# Patient Record
Sex: FEMALE | Race: WHITE | Employment: FULL TIME | URBAN - METROPOLITAN AREA
[De-identification: names, ages, dates, MRNs, and addresses within clinical notes are randomized per-mention and may not be internally consistent; named-entity substitution may affect disease eponyms.]

---

## 2017-05-25 ENCOUNTER — OFFICE VISIT (OUTPATIENT)
Dept: OBGYN CLINIC | Age: 57
End: 2017-05-25

## 2017-05-25 VITALS
WEIGHT: 215 LBS | SYSTOLIC BLOOD PRESSURE: 132 MMHG | BODY MASS INDEX: 40.59 KG/M2 | HEIGHT: 61 IN | DIASTOLIC BLOOD PRESSURE: 84 MMHG

## 2017-05-25 DIAGNOSIS — Z01.419 WELL FEMALE EXAM WITH ROUTINE GYNECOLOGICAL EXAM: Primary | ICD-10-CM

## 2017-05-25 NOTE — PATIENT INSTRUCTIONS
Pap Test: Care Instructions  Your Care Instructions  The Pap test (also called a Pap smear) is a screening test for cancer of the cervix, which is the lower part of the uterus that opens into the vagina. The test can help your doctor find early changes in the cells that could lead to cancer. The sample of cells taken during your test has been sent to a lab so that an expert can look at the cells. It usually takes a week or two to get the results back. Follow-up care is a key part of your treatment and safety. Be sure to make and go to all appointments, and call your doctor if you are having problems. It's also a good idea to know your test results and keep a list of the medicines you take. What do the results mean? · A normal result means that the test did not find any abnormal cells in the sample. · An abnormal result can mean many things. Most of these are not cancer. The results of your test may be abnormal because:  ¨ You have an infection of the vagina or cervix, such as a yeast infection. ¨ You have an IUD (intrauterine device for birth control). ¨ You have low estrogen levels after menopause that are causing the cells to change. ¨ You have cell changes that may be a sign of precancer or cancer. The results are ranked based on how serious the changes might be. There are many other reasons why you might not get a normal result. If the results were abnormal, you may need to get another test within a few weeks or months. If the results show changes that could be a sign of cancer, you may need a test called a colposcopy, which provides a more complete view of the cervix. Sometimes the lab cannot use the sample because it does not contain enough cells or was not preserved well. If so, you may need to have the test again. This is not common, but it does happen from time to time. When should you call for help?   Watch closely for changes in your health, and be sure to contact your doctor if:  · You have vaginal bleeding or pain for more than 2 days after the test. It is normal to have a small amount of bleeding for a day or two after the test.  Where can you learn more? Go to http://shilpa-santy.info/. Enter V570 in the search box to learn more about \"Pap Test: Care Instructions. \"  Current as of: July 26, 2016  Content Version: 11.2  © 5369-7882 [employer]. Care instructions adapted under license by [employer] (which disclaims liability or warranty for this information). If you have questions about a medical condition or this instruction, always ask your healthcare professional. [address] any warranty or liability for your use of this information.

## 2017-05-25 NOTE — MR AVS SNAPSHOT
Visit Information Date & Time Provider Department Dept. Phone Encounter #  
 5/25/2017  8:30 AM Silvano Hernandez MD [facility] [phone] [id number] Upcoming Health Maintenance Date Due Hepatitis C Screening 1960 BREAST CANCER SCRN MAMMOGRAM 12/26/2009 FOBT Q 1 YEAR AGE 50-75 12/26/2009 PAP AKA CERVICAL CYTOLOGY 2/28/2017 INFLUENZA AGE 9 TO ADULT 8/1/2017 Allergies as of 5/25/2017  Review Complete On: 5/25/2017 By: Roberto Hart No Known Allergies Current Immunizations  Never Reviewed No immunizations on file. Not reviewed this visit You Were Diagnosed With   
  
 Codes Comments Well female exam with routine gynecological exam    -  Primary ICD-10-CM: K71.267 ICD-9-CM: V72.31 Vitals BP Height(growth percentile) Weight(growth percentile) BMI OB Status Smoking Status 132/84 5' 1\" (1.549 m) 215 lb (97.5 kg) 40.62 kg/m2 Postmenopausal Never Smoker BMI and BSA Data Body Mass Index Body Surface Area  
 40.62 kg/m 2 2.05 m 2 Preferred Pharmacy Pharmacy Name Phone [id number] [place], [address] [phone] Your Updated Medication List  
  
   
This list is accurate as of: 5/25/17  8:52 AM.  Always use your most recent med list.  
  
  
  
  
 estradiol 0.5 mg tablet Commonly known as:  ESTRACE Take 1/2 tab by mouth daily. loratadine 10 mg Cap Take  by mouth.  
  
 medroxyPROGESTERone 2.5 mg tablet Commonly known as:  PROVERA Take 1 Tab by mouth daily. SEROquel 25 mg tablet Generic drug:  QUEtiapine Take  by mouth. We Performed the Following PAP IG, HPV AND RFX HPV 37/35,15(225009) [[id number] Custom] Patient Instructions Pap Test: Care Instructions Your Care Instructions The Pap test (also called a Pap smear) is a screening test for cancer of the cervix, which is the lower part of the uterus that opens into the vagina. The test can help your doctor find early changes in the cells that could lead to cancer. The sample of cells taken during your test has been sent to a lab so that an expert can look at the cells. It usually takes a week or two to get the results back. Follow-up care is a key part of your treatment and safety. Be sure to make and go to all appointments, and call your doctor if you are having problems. It's also a good idea to know your test results and keep a list of the medicines you take. What do the results mean? · A normal result means that the test did not find any abnormal cells in the sample. · An abnormal result can mean many things. Most of these are not cancer. The results of your test may be abnormal because: 
¨ You have an infection of the vagina or cervix, such as a yeast infection. ¨ You have an IUD (intrauterine device for birth control). ¨ You have low estrogen levels after menopause that are causing the cells to change. ¨ You have cell changes that may be a sign of precancer or cancer. The results are ranked based on how serious the changes might be. There are many other reasons why you might not get a normal result. If the results were abnormal, you may need to get another test within a few weeks or months. If the results show changes that could be a sign of cancer, you may need a test called a colposcopy, which provides a more complete view of the cervix. Sometimes the lab cannot use the sample because it does not contain enough cells or was not preserved well. If so, you may need to have the test again. This is not common, but it does happen from time to time. When should you call for help?  
Watch closely for changes in your health, and be sure to contact your doctor if: 
· You have vaginal bleeding or pain for more than 2 days after the test. It is normal to have a small amount of bleeding for a day or two after the test. 
Where can you learn more? Go to http://shilpa-santy.info/. Enter E211 in the search box to learn more about \"Pap Test: Care Instructions. \" Current as of: July 26, 2016 Content Version: 11.2 © 7509-7772 [employer]. Care instructions adapted under license by [employer] (which disclaims liability or warranty for this information). If you have questions about a medical condition or this instruction, always ask your healthcare professional. [address] any warranty or liability for your use of this information. Introducing [facility]! Steph Del Cid introduces CallResto patient portal. Now you can access parts of your medical record, email your doctor's office, and request medication refills online. 1. In your internet browser, go to [email or web address] 2. Click on the First Time User? Click Here link in the Sign In box. You will see the New Member Sign Up page. 3. Enter your CallResto Access Code exactly as it appears below. You will not need to use this code after youve completed the sign-up process. If you do not sign up before the expiration date, you must request a new code. · CallResto Access Code: [email or web address] Expires: 8/23/2017  8:51 AM 
 
4. Enter the last four digits of your Social Security Number (xxxx) and Date of Birth (mm/dd/yyyy) as indicated and click Submit. You will be taken to the next sign-up page. 5. Create a Crunchyrollt ID. This will be your CallResto login ID and cannot be changed, so think of one that is secure and easy to remember. 6. Create a Crunchyrollt password. You can change your password at any time. 7. Enter your Password Reset Question and Answer. This can be used at a later time if you forget your password. 8. Enter your e-mail address.  You will receive e-mail notification when new information is available in [facility]. 9. Click Sign Up. You can now view and download portions of your medical record. 10. Click the Download Summary menu link to download a portable copy of your medical information. If you have questions, please visit the Frequently Asked Questions section of the [employer] website. Remember, [facility] [employer] [facility] is NOT to be used for urgent needs. For medical emergencies, dial 911. Now available from your iPhone and Android! Please provide this summary of care documentation to your next provider. Your primary care clinician is listed as Margaret Ramsey. If you have any questions after today's visit, please call [phone].

## 2017-05-25 NOTE — PROGRESS NOTES
Annual exam ages 40-58    Marilia Garcia is a No obstetric history on file. ,  61 y.o. female [facility] in [place] No LMP recorded. Patient is postmenopausal..    She presents for her annual checkup. She is having no significant problems. She has some vaginal dryness. With regard to the Gardasil vaccine, she is older than the age for which it is FDA approved. Menstrual status:  She is post menopausal.    She denies dysmenorrhea. She reports no premenstrual symptoms. Contraception:    The current method of family planning is none. Sexual history:    She  reports that she does not currently engage in sexual activity. Medical conditions:    Since her last annual GYN exam about one year ago, she has not the following changes in her health history: none. Pap and Mammogram History:    Her most recent Pap smear was normal, obtained 3 year(s) ago. The patient had a recent mammogram last summer per pt which was negative for malignancy. Breast Cancer History/Substance Abuse: negative    Osteoporosis History:    Family history does not include a first or second degree relative with osteopenia or osteoporosis. Past Medical History:   Diagnosis Date    Fibroids     H/O screening mammography 2013    normal per pt- done with PCP    Pap smear for cervical cancer screening 2/28/14 neg HPV NEG     Past Surgical History:   Procedure Laterality Date    HX TUBAL LIGATION         Current Outpatient Prescriptions   Medication Sig Dispense Refill    QUEtiapine (SEROQUEL) 25 mg tablet Take  by mouth.  loratadine 10 mg cap Take  by mouth.  medroxyPROGESTERone (PROVERA) 2.5 mg tablet Take 1 Tab by mouth daily. 30 Tab 11    estradiol (ESTRACE) 0.5 mg tablet Take 1/2 tab by mouth daily. 30 Tab 12     Allergies: Review of patient's allergies indicates no known allergies. Tobacco History:  reports that she has never smoked. She does not have any smokeless tobacco history on file.   Alcohol Abuse: reports that she does not drink alcohol. Drug Abuse:  reports that she does not use illicit drugs.     Family Medical/Cancer History:   Family History   Problem Relation Age of Onset    No Known Problems Mother         Review of Systems - History obtained from the patient  Constitutional: negative for weight loss, fever, night sweats  HEENT: negative for hearing loss, earache, congestion, snoring, sorethroat  CV: negative for chest pain, palpitations, edema  Resp: negative for cough, shortness of breath, wheezing  GI: negative for change in bowel habits, abdominal pain, black or bloody stools  : negative for frequency, dysuria, hematuria, vaginal discharge  MSK: negative for back pain, joint pain, muscle pain  Breast: negative for breast lumps, nipple discharge, galactorrhea  Skin :negative for itching, rash, hives  Neuro: negative for dizziness, headache, confusion, weakness  Psych: negative for anxiety, depression, change in mood  Heme/lymph: negative for bleeding, bruising, pallor    Physical Exam    Visit Vitals    /84    Ht 5' 1\" (1.549 m)    Wt 215 lb (97.5 kg)    BMI 40.62 kg/m2       Constitutional  · Appearance: well-nourished, well developed, alert, in no acute distress    HENT  · Head and Face: appears normal    Neck  · Inspection/Palpation: normal appearance, no masses or tenderness  · Lymph Nodes: no lymphadenopathy present  · Thyroid: gland size normal, nontender, no nodules or masses present on palpation    Chest  · Respiratory Effort: breathing unlabored    Breasts  · Inspection of Breasts: breasts symmetrical, no skin changes, no discharge present, nipple appearance normal, no skin retraction present  · Palpation of Breasts and Axillae: no masses present on palpation, no breast tenderness  · Axillary Lymph Nodes: no lymphadenopathy present    Gastrointestinal  · Abdominal Examination: abdomen non-tender to palpation, normal bowel sounds, no masses present  · Liver and spleen: no hepatomegaly present, spleen not palpable  · Hernias: no hernias identified    Genitourinary  · External Genitalia: normal appearance for age, no discharge present, no tenderness present, no inflammatory lesions present, no masses present, no atrophy present  · Vagina: normal vaginal vault without central or paravaginal defects, no discharge present, no inflammatory lesions present, no masses present  · Bladder: non-tender to palpation  · Urethra: appears normal  · Cervix: normal   · Uterus: normal size, shape and consistency  · Adnexa: no adnexal tenderness present, no adnexal masses present  · Perineum: perineum within normal limits, no evidence of trauma, no rashes or skin lesions present  · Anus: anus within normal limits, no hemorrhoids present  · Inguinal Lymph Nodes: no lymphadenopathy present    Skin  · General Inspection: no rash, no lesions identified    Neurologic/Psychiatric  · Mental Status:  · Orientation: grossly oriented to person, place and time  · Mood and Affect: mood normal, affect appropriate    Assessment:  Routine gynecologic examination  Her current medical status is satisfactory with no evidence of significant gynecologic issues.     Plan:  Counseled re: diet, exercise, healthy lifestyle  Return for yearly wellness visits  Rec annual mammogram

## 2017-05-31 LAB
CYTOLOGIST CVX/VAG CYTO: NORMAL
CYTOLOGY CVX/VAG DOC THIN PREP: NORMAL
CYTOLOGY HISTORY:: NORMAL
DX ICD CODE: NORMAL
HPV I/H RISK 1 DNA CVX QL PROBE+SIG AMP: NEGATIVE
Lab: NORMAL
OTHER STN SPEC: NORMAL
PATH REPORT.FINAL DX SPEC: NORMAL
STAT OF ADQ CVX/VAG CYTO-IMP: NORMAL

## 2019-01-28 ENCOUNTER — TELEPHONE (OUTPATIENT)
Dept: OBGYN CLINIC | Age: 59
End: 2019-01-28

## 2019-01-28 NOTE — TELEPHONE ENCOUNTER
Patient calling back stating that she received a call from our office. She is aware of the need for additional images and that appointment has been scheduled for 2/8/2019.

## 2019-01-28 NOTE — TELEPHONE ENCOUNTER
Theresia Kerry, MD [facility]             Needs additional imaging, notify pt, schedule, and tickle      LMTCB

## 2019-03-14 ENCOUNTER — OFFICE VISIT (OUTPATIENT)
Dept: OBGYN CLINIC | Age: 59
End: 2019-03-14

## 2019-03-14 VITALS
WEIGHT: 200 LBS | SYSTOLIC BLOOD PRESSURE: 122 MMHG | BODY MASS INDEX: 37.76 KG/M2 | DIASTOLIC BLOOD PRESSURE: 84 MMHG | HEIGHT: 61 IN

## 2019-03-14 DIAGNOSIS — Z01.419 ENCOUNTER FOR GYNECOLOGICAL EXAMINATION WITHOUT ABNORMAL FINDING: Primary | ICD-10-CM

## 2019-03-14 DIAGNOSIS — N89.8 VAGINAL DISCHARGE: ICD-10-CM

## 2019-03-14 PROBLEM — E66.01 SEVERE OBESITY (HCC): Status: ACTIVE | Noted: 2019-03-14

## 2019-03-14 NOTE — PATIENT INSTRUCTIONS

## 2019-03-14 NOTE — PROGRESS NOTES
Annual exam ages 40-58    Marilia Ni is a No obstetric history on file. ,  60 y.o. female [place]  No LMP recorded. Patient is postmenopausal..    She presents for her annual checkup. She is having significant vaginal discharge. With regard to the Gardasil vaccine, she is older than the age for which it is FDA approved. Menstrual status:    Her periods are absent. She denies dysmenorrhea. She reports no premenstrual symptoms. Contraception:    The current method of family planning is none. Sexual history:    She  reports that she does not currently engage in sexual activity. Medical conditions:    Since her last annual GYN exam about one year ago, she has not the following changes in her health history: none. Pap and Mammogram History:    Her most recent Pap smear was normal, obtained 2 year(s) ago. The patient had a recent mammogram 2/2019 which was negative for malignancy. Breast Cancer History/Substance Abuse: negative    Osteoporosis History:    Family history does not include a first or second degree relative with osteopenia or osteoporosis. Past Medical History:   Diagnosis Date    Fibroids     H/O screening mammography 2013    normal per pt- done with PCP    Pap smear for cervical cancer screening 2/28/14 neg HPV NEG 5/25/17 neg HPV NEG     Past Surgical History:   Procedure Laterality Date    HX TUBAL LIGATION         Current Outpatient Medications   Medication Sig Dispense Refill    QUEtiapine (SEROQUEL) 25 mg tablet Take  by mouth.  loratadine 10 mg cap Take  by mouth.  medroxyPROGESTERone (PROVERA) 2.5 mg tablet Take 1 Tab by mouth daily. 30 Tab 11    estradiol (ESTRACE) 0.5 mg tablet Take 1/2 tab by mouth daily. 30 Tab 12     Allergies: Patient has no known allergies. Tobacco History:  reports that  has never smoked. she has never used smokeless tobacco.  Alcohol Abuse:  reports that she does not drink alcohol.   Drug Abuse:  reports that she does not use drugs.     Family Medical/Cancer History:   Family History   Problem Relation Age of Onset    No Known Problems Mother         Review of Systems - History obtained from the patient  Constitutional: negative for weight loss, fever, night sweats  HEENT: negative for hearing loss, earache, congestion, snoring, sorethroat  CV: negative for chest pain, palpitations, edema  Resp: negative for cough, shortness of breath, wheezing  GI: negative for change in bowel habits, abdominal pain, black or bloody stools  : negative for frequency, dysuria, hematuria, vaginal discharge  MSK: negative for back pain, joint pain, muscle pain  Breast: negative for breast lumps, nipple discharge, galactorrhea  Skin :negative for itching, rash, hives  Neuro: negative for dizziness, headache, confusion, weakness  Psych: negative for anxiety, depression, change in mood  Heme/lymph: negative for bleeding, bruising, pallor    Physical Exam    Visit Vitals  /84   Ht 5' 1\" (1.549 m)   Wt 200 lb (90.7 kg)   BMI 37.79 kg/m²       Constitutional  · Appearance: well-nourished, well developed, alert, in no acute distress    HENT  · Head and Face: appears normal    Neck  · Inspection/Palpation: normal appearance, no masses or tenderness  · Lymph Nodes: no lymphadenopathy present  · Thyroid: gland size normal, nontender, no nodules or masses present on palpation    Chest  · Respiratory Effort: breathing unlabored    Breasts  · Inspection of Breasts: breasts symmetrical, no skin changes, no discharge present, nipple appearance normal, no skin retraction present  · Palpation of Breasts and Axillae: no masses present on palpation, no breast tenderness  · Axillary Lymph Nodes: no lymphadenopathy present    Gastrointestinal  · Abdominal Examination: abdomen non-tender to palpation, normal bowel sounds, no masses present  · Liver and spleen: no hepatomegaly present, spleen not palpable  · Hernias: no hernias identified    Genitourinary  · External Genitalia: normal appearance for age, no discharge present, no tenderness present, no inflammatory lesions present, no masses present, no atrophy present  · Vagina: normal vaginal vault without central or paravaginal defects, + discharge present, no inflammatory lesions present, no masses present  · Bladder: non-tender to palpation  · Urethra: appears normal  · Cervix: normal   · Uterus: normal size, shape and consistency  · Adnexa: no adnexal tenderness present, no adnexal masses present  · Perineum: perineum within normal limits, no evidence of trauma, no rashes or skin lesions present  · Anus: anus within normal limits, no hemorrhoids present  · Inguinal Lymph Nodes: no lymphadenopathy present    Skin  · General Inspection: no rash, no lesions identified    Neurologic/Psychiatric  · Mental Status:  · Orientation: grossly oriented to person, place and time  · Mood and Affect: mood normal, affect appropriate    Assessment:  Routine gynecologic examination  Her current medical status is satisfactory with no evidence of significant gynecologic issues.   Vaginal discharge- will f/u with nuswab    Plan:  Counseled re: diet, exercise, healthy lifestyle  Return for yearly wellness visits  Rec annual mammogram

## 2019-03-16 LAB
A VAGINAE DNA VAG QL NAA+PROBE: NORMAL SCORE
BVAB2 DNA VAG QL NAA+PROBE: NORMAL SCORE
C ALBICANS DNA VAG QL NAA+PROBE: NEGATIVE
C GLABRATA DNA VAG QL NAA+PROBE: NEGATIVE
MEGA1 DNA VAG QL NAA+PROBE: NORMAL SCORE

## 2019-03-21 ENCOUNTER — TELEPHONE (OUTPATIENT)
Dept: OBGYN CLINIC | Age: 59
End: 2019-03-21

## 2019-03-21 NOTE — TELEPHONE ENCOUNTER
Pt notified of recent results : Notes recorded by Evan Hassan MD on 3/18/2019 at 2:53 PM EDT  Results normal, add to facesheet  Pt is upset and concerned because she has vaginal discharge and her swab was negative. She said she has an excessive amount of discharge and this is new for her in the last month. I explained to the pt that she could have STD testing performed as that can be a cause of discharge and pt declined. I also asked her if she was leaking urine and she denies incontinence. I notified her that this could be her new normal discharge since she did not have an infection. She wanted me to speak with Dr. Pamela Carter about why she has devolped discharge at the age of 60.

## 2019-03-22 NOTE — TELEPHONE ENCOUNTER
Call received at 2:44PM      Patient advised of MD recommendations. and was placed on the schedule to be seen at on 4/11/19 at 11:00am for the ultrasound and then 11:20am for follow up with Dr. Valderrama Knee      Patient verbalized understanding.

## 2019-03-22 NOTE — TELEPHONE ENCOUNTER
She can rto for an ultraosund as sometime issues with the fallopian tubes can cause excess discharge.   Also make sure she is only washing with water, no perfumed or dyed soaps, no douching, etc.

## 2019-08-13 ENCOUNTER — OFFICE VISIT (OUTPATIENT)
Dept: OBGYN CLINIC | Age: 59
End: 2019-08-13

## 2019-08-13 DIAGNOSIS — R92.1 BREAST CALCIFICATION, RIGHT: Primary | ICD-10-CM

## 2019-08-13 NOTE — PROGRESS NOTES
Problem Visit-Limited    Chief Complaint   Breast Problem      HPI  Cornelius Morillo is a 60 y.o. female who presents for the evaluation of recent abnormal mammogram.. She wants to discuss her mammogram findings. She does not want to do a serotactic biopsy. She denies breast pain. She had a biopsy approx 15 years ago that was not a stereotactic biopsy and was has concerned re: compression, pain, bruising, etc .    Past Medical History:   Diagnosis Date    Fibroids     H/O screening mammography 2013    normal per pt- done with PCP    Pap smear for cervical cancer screening 2/28/14 neg HPV NEG 5/25/17 neg HPV NEG     Past Surgical History:   Procedure Laterality Date    HX TUBAL LIGATION       Social History     Occupational History    Not on file   Tobacco Use    Smoking status: Never Smoker    Smokeless tobacco: Never Used   Substance and Sexual Activity    Alcohol use: No    Drug use: No    Sexual activity: Not Currently     Family History   Problem Relation Age of Onset    No Known Problems Mother        No Known Allergies  Prior to Admission medications    Medication Sig Start Date End Date Taking? Authorizing Provider   loratadine 10 mg cap Take  by mouth. Provider, Historical   medroxyPROGESTERone (PROVERA) 2.5 mg tablet Take 1 Tab by mouth daily. 5/23/16   Brandon River MD   estradiol (ESTRACE) 0.5 mg tablet Take 1/2 tab by mouth daily. 5/23/16   Brandon River MD   QUEtiapine (SEROQUEL) 25 mg tablet Take  by mouth.     Provider, Historical        Review of Systems: History obtained from the patient  Constitutional: negative for weight loss, fever, night sweats  Breast: negative for breast lumps, nipple discharge, galactorrhea  GI: negative for change in bowel habits, abdominal pain, black or bloody stools  : negative for frequency, dysuria, hematuria, vaginal discharge  MSK: negative for back pain, joint pain, muscle pain  Skin: negative for itching, rash, hives  Psych: negative for anxiety, depression, change in mood      Objective:    Physical Exam:     Constitutional  · Appearance: well-nourished, well developed, alert, in no acute distress    Skin  · General Inspection: no rash, no lesions identified    Neurologic/Psychiatric  · Mental Status:  · Orientation: grossly oriented to person, place and time  · Mood and Affect: mood normal, affect appropriate    Assessment/Plan:  Abnormal mammogram- breast calcifications. Discussed with pt the need for biopsy/histology to evaluate for malignancy. Discussed the purpose of the stereotactic biopsy. Will refer to DR BILLY MOORE [facility] for discussion of any other possible alternative . Instructions given to pt. Handouts given to pt.     More than 50% of this 15 minute visit was spent in face to face counseling

## 2019-08-20 ENCOUNTER — OFFICE VISIT (OUTPATIENT)
Dept: SURGERY | Age: 59
End: 2019-08-20

## 2019-08-20 VITALS
DIASTOLIC BLOOD PRESSURE: 77 MMHG | SYSTOLIC BLOOD PRESSURE: 124 MMHG | HEART RATE: 62 BPM | HEIGHT: 61 IN | BODY MASS INDEX: 35.3 KG/M2 | WEIGHT: 187 LBS

## 2019-08-20 DIAGNOSIS — R92.8 ABNORMAL MAMMOGRAM: Primary | ICD-10-CM

## 2019-08-20 NOTE — PROGRESS NOTES
HISTORY OF PRESENT ILLNESS  Thiago Samuel is a 60 y.o. female. HPI NEW patient referral for consultation by Dr. Giulia Paulson for an abnormal mammogram.  RIGHT breast calcs seen on screening mammogram 2019.  6 month follow up mammogram recommended stereotactic biopsy for increasing calcificaitons. The patient denies any palpable lumps, nipple inversion or discharge. Pt had a stereoatctic biopsy in 2003 and it was painless. The description she was given for the upright stereotactic machine sounded very uncomfortable. .  OB History    None      Obstetric Comments   Menarche 9 LMP unsure, # of children none, age of 1st delivery N/A Hysterectomy/oophorectomy No/No, Breast bx yes/ left 2003 benign, history of breast feeding n/a, BCP yes, Hormone therapy yes, but has since stopped taking. No family history of breast or ovarian cancer  Imaging - 8/8/2019  RIGHT diagnostic mammogram done at [facility] 25 4 RIGHT breast calcifications suspicious. Review of Systems   Constitutional: Negative. HENT: Negative. Eyes: Negative. Respiratory: Negative. Cardiovascular: Negative. Gastrointestinal: Negative. Genitourinary: Negative. Musculoskeletal: Positive for joint pain. Skin: Negative. Neurological: Negative. Endo/Heme/Allergies: Negative. Psychiatric/Behavioral: Negative. Physical Exam   Constitutional: She is oriented to person, place, and time. She appears well-developed and well-nourished. Cardiovascular: Normal rate, regular rhythm and normal heart sounds. Pulmonary/Chest: Effort normal and breath sounds normal. Right breast exhibits no mass, no nipple discharge, no skin change and no tenderness. Left breast exhibits no mass, no nipple discharge, no skin change and no tenderness. Breasts are symmetrical.   Lymphadenopathy:     She has no axillary adenopathy. Neurological: She is alert and oriented to person, place, and time. Skin: Skin is warm and dry.      Past Medical History:   Diagnosis Date    Fibroids     H/O screening mammography 2013    normal per pt- done with PCP    Pap smear for cervical cancer screening 2/28/14 neg HPV NEG 5/25/17 neg HPV NEG     Past Surgical History:   Procedure Laterality Date    HX TUBAL LIGATION        OB History    None      Obstetric Comments   Menarche 9 LMP unsure, # of children none, age of 1st delivery N/A Hysterectomy/oophorectomy No/No, Breast bx yes/ left 2003 benign, history of breast feeding n/a, BCP yes, Hormone therapy yes, but has since stopped taking. Family History   Problem Relation Age of Onset    No Known Problems Mother      Social History     Tobacco Use    Smoking status: Never Smoker    Smokeless tobacco: Never Used   Substance Use Topics    Alcohol use: No      Prior to Admission medications    Medication Sig Start Date End Date Taking? Authorizing Provider   loratadine 10 mg cap Take  by mouth. Yes Provider, Historical   QUEtiapine (SEROQUEL) 25 mg tablet Take  by mouth. Yes Provider, Historical      No Known Allergies        ASSESSMENT and PLAN    ICD-10-CM ICD-9-CM    1. Abnormal mammogram R92.8 793.80       RIGHT breast microcalcs  Discussed the potential results, 20% risk of early breast cancer, 80% chance of a benign biopsy.   Will schedule RIGHT breast stereotactic biopsy

## 2019-08-20 NOTE — PATIENT INSTRUCTIONS

## 2019-08-22 ENCOUNTER — DOCUMENTATION ONLY (OUTPATIENT)
Dept: SURGERY | Age: 59
End: 2019-08-22

## 2019-08-22 NOTE — PROGRESS NOTES
Type of Film: [x] CD [] FILMS  Type of Test: [] MRI [x] MAMMO  From: [facility]  Given to: placed in shred box after upload to PACS

## 2019-09-08 DIAGNOSIS — R92.8 ABNORMAL MAMMOGRAM: Primary | ICD-10-CM

## 2019-10-03 ENCOUNTER — DOCUMENTATION ONLY (OUTPATIENT)
Dept: SURGERY | Age: 59
End: 2019-10-03

## 2019-10-03 ENCOUNTER — HOSPITAL ENCOUNTER (OUTPATIENT)
Dept: MAMMOGRAPHY | Age: 59
Discharge: HOME | End: 2019-10-03
Attending: SURGERY
Payer: COMMERCIAL

## 2019-10-03 ENCOUNTER — OFFICE VISIT (OUTPATIENT)
Dept: SURGERY | Age: 59
End: 2019-10-03

## 2019-10-03 VITALS
HEART RATE: 64 BPM | BODY MASS INDEX: 34.36 KG/M2 | SYSTOLIC BLOOD PRESSURE: 110 MMHG | WEIGHT: 182 LBS | DIASTOLIC BLOOD PRESSURE: 74 MMHG | HEIGHT: 61 IN

## 2019-10-03 DIAGNOSIS — R92.1 BREAST CALCIFICATIONS: Primary | ICD-10-CM

## 2019-10-03 DIAGNOSIS — R92.8 ABNORMAL MAMMOGRAM: ICD-10-CM

## 2019-10-03 PROCEDURE — 77065 DX MAMMO INCL CAD UNI: CPT

## 2019-10-03 PROCEDURE — 19081 BX BREAST 1ST LESION STRTCTC: CPT

## 2019-10-03 NOTE — PATIENT INSTRUCTIONS
Stereotactic Breast Biopsy: About This Test  What is it? Stereotactic breast biopsy is a test that uses imaging, such as X-ray, to find an area of your breast where a tissue sample will be taken. The sample is looked at under a microscope to check for signs of breast cancer. Why is this test done? This type of breast biopsy is usually done to check for cancer in a lump found during a mammogram.  How can you prepare for the test?  Talk to your doctor about all your health conditions before the test. For example, tell your doctor if you:  · Are taking any medicines. · Are allergic to any medicines. · Are allergic to latex. · Have had bleeding problems, or if you take aspirin or some other blood thinner. · Are or might be pregnant. What happens before the test?  · You will take off your clothing above the waist. A paper or cloth gown will cover your shoulders. · Your skin is washed with a special soap. · You will be given a shot of medicine to numb the biopsy area on your breast.  · Images are taken to find the exact site for the biopsy. What happens during the test?  · You may sit in a chair, or you may lie on your stomach on a table that has a hole for your breast to hang through. · Once the area in your breast is numb, a small cut (incision) is made in the skin. · Using the imaging, the doctor will guide the needle into the biopsy area. · A sample of breast tissue is taken through the needle. · A small clip is usually inserted into your breast to sander the biopsy site. · The needle is removed and pressure put on the needle site to stop any bleeding. · A bandage is put on the needle site. What else should you know about the test?  · The small cut for the needle does not usually need stitches. How long does the test take? · The test will take about 60 minutes. Most of the time is spent preparing for the images and finding the area for the biopsy.   What happens after the test?  · You'll be told how long it may take to get your results back. · You will probably be able to go home right away. · You can go back to your usual activities right away, but avoid heavy lifting for 24 hours. · The site may be tender for 2 or 3 days. You may also have some bruising, swelling, or slight bleeding. ? You can use an ice pack. Put ice or a cold pack on the area for 10 to 20 minutes at a time. Put a thin cloth between the ice and your skin. ? Ask your doctor if you can take an over-the-counter pain medicine, such as acetaminophen (Tylenol), ibuprofen (Advil, Motrin), or naproxen (Aleve). Be safe with medicines. Read and follow all instructions on the label. · After a specialist looks at the biopsy sample for signs of cancer, your doctor's office will let you know the results. · If the test results are not clear, you may have another biopsy or test.  Follow-up care is a key part of your treatment and safety. Be sure to make and go to all appointments, and call your doctor if you are having problems. It's also a good idea to keep a list of the medicines you take. Ask your doctor when you can expect to have your test results. Where can you learn more? Go to http://shilpa-santy.info/. Enter Y286 in the search box to learn more about \"Stereotactic Breast Biopsy: About This Test.\"  Current as of: December 19, 2018  Content Version: 12.2  © 9732-0814 [employer]. Care instructions adapted under license by [employer] (which disclaims liability or warranty for this information). If you have questions about a medical condition or this instruction, always ask your healthcare professional. [place] [address] any warranty or liability for your use of this information.

## 2019-10-03 NOTE — PROGRESS NOTES
HISTORY OF PRESENT ILLNESS  Sena Ortiz is a 60 y.o. female. HPI ESTABLISHED patient here for stereotactic biopsy RIGHT breast. The patient had an abnormal mammogram in 8/2019. Imaging - 8/8/2019  RIGHT diagnostic mammogram done at [place] [address] 4 RIGHT breast calcifications suspicious    ROS    Physical Exam   Constitutional: She is oriented to person, place, and time. She appears well-developed and well-nourished. Cardiovascular: Normal rate, regular rhythm and normal heart sounds. Pulmonary/Chest: Effort normal and breath sounds normal. Right breast exhibits no inverted nipple, no mass, no nipple discharge, no skin change and no tenderness. Left breast exhibits no inverted nipple, no mass, no nipple discharge, no skin change and no tenderness. Breasts are symmetrical.   Lymphadenopathy:     She has no axillary adenopathy. Neurological: She is alert and oriented to person, place, and time. Skin: Skin is warm and dry. Past Medical History:   Diagnosis Date    Fibroids     H/O screening mammography 2013    normal per pt- done with PCP    Pap smear for cervical cancer screening 2/28/14 neg HPV NEG 5/25/17 neg HPV NEG     Past Surgical History:   Procedure Laterality Date    HX TUBAL LIGATION        OB History    None      Obstetric Comments   Menarche 9 LMP unsure, # of children none, age of 1st delivery N/A Hysterectomy/oophorectomy No/No, Breast bx yes/ left 2003 benign, history of breast feeding n/a, BCP yes, Hormone therapy yes, but has since stopped taking. Family History   Problem Relation Age of Onset    No Known Problems Mother      Social History     Tobacco Use    Smoking status: Never Smoker    Smokeless tobacco: Never Used   Substance Use Topics    Alcohol use: No      Prior to Admission medications    Medication Sig Start Date End Date Taking? Authorizing Provider   loratadine 10 mg cap Take  by mouth.    Yes Provider, Historical   QUEtiapine (SEROQUEL) 25 mg tablet Take by mouth. Yes Provider, Historical      No Known Allergies        Stereotactic Biopsy  PROCEDURE : LORAD STEREOTACTIC VACUUM ASSISTED BIOPSY AND RELATED DIGITAL MAMMOGRAPHY OF THE, Right BREAST. INDICATION : MICROCALCIFICATIONS. CONSENT : Following a detailed description of the LORAD stereotactic core biopsy procedure, its risks, benefits and possible alternatives (open biopsy), the patient signed the informed consent. The risks and benefits of the procedure have been explained to the patient by the stereotactic technologist and Dr. Chandler Nelson. IMAGING : Prebiopsy digital stereotactic imaging of the breast was obtained and confirmed the presence of the abnormality in both  and stereotactic views. The Right breast was imaged. PROJECTION : RIGHT LMO Z 31mm/68.8mm. BIOPSY PROCEDURE : Following sterile preparation of the skin, 1% lidocaine was used as a local anesthetic. A 4mm skin incision was made for the entry site of the biopsy needle. Prefire stereotactic images were obtained to confirm accurate targeting. A 9 gauge Suros needle was used for the biopsy. 6 biopsy specimens were obtained. CLIP PLACEMENT : A clip was placed for future mammographic reference and position was confirmed with a 0 degree postfire image. SPECIMEN RADIOGRAPHY : Digital spot mammography of the core biopsy specimens demonstrated microcalcifications corresponding to the targeted calcifications. TOLERANCE:  Pt tolerated the procedure with no discomfort. FINAL PATHOLOGY : BREAST, RIGHT:   BENIGN BREAST TISSUE WITH FIBROSIS, CYSTIC CHANGE, COLUMNAR CELL CHANGE   AND SCLEROSING ADENOSIS WITH ASSOCIATED MICROCALCIFICATIONS. NEGATIVE FOR IN SITU AND INVASIVE CARCINOMA. POST BIOPSY MAMMOGRAM : CLIP IN GOOD POSITION. CONCORDANCE:  yes  ASSESSMENT and PLAN    ICD-10-CM ICD-9-CM    1. Breast calcifications R92.1 793.89 SURGICAL PATHOLOGY      CANCELED: SURGICAL PATHOLOGY      CANCELED: SURGICAL PATHOLOGY   2.  Abnormal mammogram R92.8 793.80      RIGHT breast stereotactic biopsy    Called patient  Biopsy is benign   Resume annual mammograms.

## 2019-10-08 LAB
DX ICD CODE: NORMAL
DX ICD CODE: NORMAL
PATH REPORT.FINAL DX SPEC: NORMAL
PATH REPORT.GROSS SPEC: NORMAL
PATH REPORT.RELEVANT HX SPEC: NORMAL
PATH REPORT.SITE OF ORIGIN SPEC: NORMAL
PATHOLOGIST NAME: NORMAL
PAYMENT PROCEDURE: NORMAL

## 2019-12-06 ENCOUNTER — TELEPHONE (OUTPATIENT)
Dept: OBGYN CLINIC | Age: 59
End: 2019-12-06

## 2019-12-06 NOTE — TELEPHONE ENCOUNTER
[facility] patient     She is advising that she was told to call [facility] when she was starting to think about coming sexually active again. She is concerned about painful intercourse. She said she is just preparing ahead of time and has not tried any otc meds since it is just in the \"thought process\".     [facility] [address]

## 2019-12-09 NOTE — TELEPHONE ENCOUNTER
If she is concerned re: vaginal atrophy/dryness, then she can start estrace cream pv twice weekly (takes about 6 months before significant improvement noticed). #1 tube with 11 refills.

## 2019-12-10 NOTE — TELEPHONE ENCOUNTER
Call received at 315PM    60year old patient last seen in the office on 8/13/19. Patient upset with waiting 20 minutes to speak to a nurse. Patient advised of MD recommendations and then declined the medication since it would take to long to work and she should have told me this at my appointment . Patient hung up the phone.

## 2021-04-22 ENCOUNTER — TRANSCRIBE ORDER (OUTPATIENT)
Dept: REGISTRATION | Age: 61
End: 2021-04-22

## 2021-04-22 DIAGNOSIS — Z29.9 PREVENTIVE MEASURE: Primary | ICD-10-CM

## 2021-04-30 ENCOUNTER — HOSPITAL ENCOUNTER (OUTPATIENT)
Dept: CT IMAGING | Age: 61
Discharge: HOME | End: 2021-04-30
Payer: SELF-PAY

## 2021-04-30 DIAGNOSIS — Z29.9 PREVENTIVE MEASURE: ICD-10-CM

## 2021-04-30 PROCEDURE — 75571 CT HRT W/O DYE W/CA TEST: CPT

## 2021-05-03 NOTE — CARDIO/PULMONARY
Reached patient at her given mobile number. Patient states she has seen her coronary artery CT score of zero on MyChart  Discussed the meaning of this score. Patient has no further questions at this time.

## 2022-03-19 PROBLEM — E66.01 SEVERE OBESITY (HCC): Status: ACTIVE | Noted: 2019-03-14

## 2023-05-20 RX ORDER — LORATADINE 10 MG/1
CAPSULE, LIQUID FILLED ORAL
COMMUNITY

## 2023-05-20 RX ORDER — QUETIAPINE FUMARATE 25 MG/1
TABLET, FILM COATED ORAL
COMMUNITY